# Patient Record
Sex: MALE | Race: WHITE | URBAN - METROPOLITAN AREA
[De-identification: names, ages, dates, MRNs, and addresses within clinical notes are randomized per-mention and may not be internally consistent; named-entity substitution may affect disease eponyms.]

---

## 2022-12-21 ENCOUNTER — EMERGENCY (EMERGENCY)
Facility: HOSPITAL | Age: 57
LOS: 1 days | Discharge: LEFT BEFORE DISCHARGE | End: 2022-12-21
Admitting: EMERGENCY MEDICINE

## 2022-12-21 VITALS
OXYGEN SATURATION: 96 % | HEART RATE: 65 BPM | TEMPERATURE: 98 F | SYSTOLIC BLOOD PRESSURE: 194 MMHG | WEIGHT: 255.07 LBS | RESPIRATION RATE: 18 BRPM | DIASTOLIC BLOOD PRESSURE: 79 MMHG

## 2022-12-21 DIAGNOSIS — R29.898 OTHER SYMPTOMS AND SIGNS INVOLVING THE MUSCULOSKELETAL SYSTEM: ICD-10-CM

## 2022-12-21 DIAGNOSIS — Z53.29 PROCEDURE AND TREATMENT NOT CARRIED OUT BECAUSE OF PATIENT'S DECISION FOR OTHER REASONS: ICD-10-CM

## 2022-12-21 PROCEDURE — 99283 EMERGENCY DEPT VISIT LOW MDM: CPT

## 2022-12-21 NOTE — ED PROVIDER NOTE - OBJECTIVE STATEMENT
BIBEMS for evaluation c/o right knee clicking intermittently, states he was evaluated for this previously, had neg xrays, pending MRI in a few weeks. no trauma or fall. ambulatory. patient stated his name is fredrick villegas to EMS and provided ID to EMS stating his name was fredrick villegas, signed ACR as fredrick villegas. however upon my evaluation, provided a different name and states fredrick villegas is his doctor's name, patient left ED prior to to full registation.

## 2022-12-21 NOTE — ED ADULT NURSE NOTE - NS ED NURSE ELOPE COMMENTS
pt asked to use the bathroom states that was all he wanted and left- spoke with charge RN about something else

## 2022-12-21 NOTE — ED PROVIDER NOTE - PHYSICAL EXAMINATION
CONSTITUTIONAL: Well-appearing; well-nourished; in no apparent distress.   	HEAD: Normocephalic; atraumatic.   	RLE: normal appearance, nontender, full ROM joints, dpi. soft compartments. ambulatory.   	NEURO: A & O x 3; face symmetric; grossly unremarkable.   PSYCHOLOGICAL: The patient’s mood and manner are appropriate.

## 2022-12-21 NOTE — ED PROVIDER NOTE - CARE PROVIDERS DIRECT ADDRESSES
[email or web address]

## 2022-12-21 NOTE — ED PROVIDER NOTE - PATIENT PORTAL LINK FT
You can access the FollowMyHealth Patient Portal offered by [facility] by registering at the following website: [email or web address]. By joining [employer]’s FollowMyHealth portal, you will also be able to view your health information using other applications (apps) compatible with our system.

## 2022-12-21 NOTE — ED PROVIDER NOTE - CLINICAL SUMMARY MEDICAL DECISION MAKING FREE TEXT BOX
right knee clicking, atraumatic, had previous xrays that were normal, scheduled for MRI knee in a few weeks, ambulatory. advised f/u ortho    patient eloped during ER stay, did not get registered.

## 2022-12-21 NOTE — ED PROVIDER NOTE - CARE PROVIDER_API CALL
Jerrell Alan)  Orthopaedic Surgery  [address] [place] [address]  Phone: [phone]  Fax: [phone]  Follow Up Time: